# Patient Record
Sex: FEMALE | Race: WHITE | URBAN - METROPOLITAN AREA
[De-identification: names, ages, dates, MRNs, and addresses within clinical notes are randomized per-mention and may not be internally consistent; named-entity substitution may affect disease eponyms.]

---

## 2022-07-26 ENCOUNTER — OFFICE VISIT (OUTPATIENT)
Dept: URBAN - METROPOLITAN AREA CLINIC 51 | Facility: CLINIC | Age: 42
End: 2022-07-26
Payer: COMMERCIAL

## 2022-07-26 DIAGNOSIS — R51.9 HEADACHE, UNSPECIFIED: Primary | ICD-10-CM

## 2022-07-26 DIAGNOSIS — H52.4 PRESBYOPIA: ICD-10-CM

## 2022-07-26 PROCEDURE — 92004 COMPRE OPH EXAM NEW PT 1/>: CPT | Performed by: OPTOMETRIST

## 2022-07-26 PROCEDURE — 92134 CPTRZ OPH DX IMG PST SGM RTA: CPT | Performed by: OPTOMETRIST

## 2022-07-26 PROCEDURE — 92133 CPTRZD OPH DX IMG PST SGM ON: CPT | Performed by: OPTOMETRIST

## 2022-07-26 ASSESSMENT — KERATOMETRY
OD: 42.94
OS: 42.70

## 2022-07-26 ASSESSMENT — VISUAL ACUITY
OD: 20/20
OS: 20/20

## 2022-07-26 ASSESSMENT — INTRAOCULAR PRESSURE
OD: 19
OS: 19

## 2022-07-26 NOTE — IMPRESSION/PLAN
Impression: Headache, unspecified: R51.9. Plan: Patient has been experiencing HA that are accompanied by dizziness and nausea x 2 years. No ocular etiology noted that accounts for complaints. Symptoms do not appear to be eye or vision related. Recommend follow up with PCP for further evaluation.

## 2022-07-26 NOTE — IMPRESSION/PLAN
Impression: Presbyopia: H52.4. Plan: Patient noticing blurred vision at both near and intermediate. Pt prefers not to wear glasses due to inability to proper fit/ comfort on nose. Discussed the option of releasing SRx vs. multifocal or monovision CL. Patient aware we do not fit or prescribe CL and would have to follow up with outside provider (Ex. Jacque, [address] or Dr. Marcelino Johnson). Discussed Vuity eye drop, however, do not recommend as HA are a side effect and patient already struggling with HA.